# Patient Record
Sex: FEMALE
[De-identification: names, ages, dates, MRNs, and addresses within clinical notes are randomized per-mention and may not be internally consistent; named-entity substitution may affect disease eponyms.]

---

## 2018-03-13 ENCOUNTER — HOSPITAL ENCOUNTER (OUTPATIENT)
Dept: HOSPITAL 88 - RAD | Age: 68
End: 2018-03-13
Attending: OBSTETRICS & GYNECOLOGY
Payer: MEDICARE

## 2018-03-13 DIAGNOSIS — I10: ICD-10-CM

## 2018-03-13 DIAGNOSIS — Z01.812: ICD-10-CM

## 2018-03-13 DIAGNOSIS — N95.0: Primary | ICD-10-CM

## 2018-03-13 DIAGNOSIS — I35.0: ICD-10-CM

## 2018-03-13 DIAGNOSIS — Z53.09: ICD-10-CM

## 2018-03-13 DIAGNOSIS — Z01.810: ICD-10-CM

## 2018-03-13 DIAGNOSIS — Z01.818: ICD-10-CM

## 2018-03-13 PROCEDURE — 93005 ELECTROCARDIOGRAM TRACING: CPT

## 2018-03-13 PROCEDURE — 71046 X-RAY EXAM CHEST 2 VIEWS: CPT

## 2018-03-13 PROCEDURE — 80048 BASIC METABOLIC PNL TOTAL CA: CPT

## 2018-03-13 PROCEDURE — 36415 COLL VENOUS BLD VENIPUNCTURE: CPT

## 2018-03-13 PROCEDURE — 85025 COMPLETE CBC W/AUTO DIFF WBC: CPT

## 2018-03-13 NOTE — DIAGNOSTIC IMAGING REPORT
PROCEDURE:CHEST 2 VIEWS

TECHNIQUE:PA and lateral chest

INDICATION:Preoperative evaluation for D\T\C

COMPARISON:None.

 

FINDINGS:

The lungs are clear and symmetrically inflated. Prominent cardiac 

silhouette and left atrial mogul. Mild central vascular congestion. 

Intact skeleton. Multilevel degenerative disc disease.

 

 

CONCLUSION:

Mild central vascular congestion and mild cardiomegaly. No acute 

abnormality.

 

 

 

Dictated by:  Clifford Ríos M.D. on 3/13/2018 at 14:43     

Electronically approved by:  Clifford Ríos M.D. on 3/13/2018 at 

14:43

## 2018-03-14 LAB
ANION GAP SERPL CALC-SCNC: 13.9 MMOL/L (ref 8–16)
BASOPHILS # BLD AUTO: 0.1 10*3/UL (ref 0–0.1)
BASOPHILS NFR BLD AUTO: 1.4 % (ref 0–1)
BUN SERPL-MCNC: 15 MG/DL (ref 7–26)
BUN/CREAT SERPL: 21 (ref 6–25)
CALCIUM SERPL-MCNC: 9.3 MG/DL (ref 8.4–10.2)
CHLORIDE SERPL-SCNC: 105 MMOL/L (ref 98–107)
CO2 SERPL-SCNC: 31 MMOL/L (ref 22–29)
DEPRECATED NEUTROPHILS # BLD AUTO: 3.1 10*3/UL (ref 2.1–6.9)
EGFRCR SERPLBLD CKD-EPI 2021: > 60 ML/MIN (ref 60–?)
EOSINOPHIL # BLD AUTO: 0.2 10*3/UL (ref 0–0.4)
EOSINOPHIL NFR BLD AUTO: 3.4 % (ref 0–6)
ERYTHROCYTE [DISTWIDTH] IN CORD BLOOD: 13.2 % (ref 11.7–14.4)
GLUCOSE SERPLBLD-MCNC: 116 MG/DL (ref 74–118)
HCT VFR BLD AUTO: 39.2 % (ref 34.2–44.1)
HGB BLD-MCNC: 12.9 G/DL (ref 12–16)
LYMPHOCYTES # BLD: 1.6 10*3/UL (ref 1–3.2)
LYMPHOCYTES NFR BLD AUTO: 27.9 % (ref 18–39.1)
MCH RBC QN AUTO: 29.9 PG (ref 28–32)
MCHC RBC AUTO-ENTMCNC: 32.9 G/DL (ref 31–35)
MCV RBC AUTO: 90.7 FL (ref 81–99)
MONOCYTES # BLD AUTO: 0.8 10*3/UL (ref 0.2–0.8)
MONOCYTES NFR BLD AUTO: 14.1 % (ref 4.4–11.3)
NEUTS SEG NFR BLD AUTO: 53 % (ref 38.7–80)
PLATELET # BLD AUTO: 211 X10E3/UL (ref 140–360)
POTASSIUM SERPL-SCNC: 4.9 MMOL/L (ref 3.5–5.1)
RBC # BLD AUTO: 4.32 X10E6/UL (ref 3.6–5.1)
SODIUM SERPL-SCNC: 145 MMOL/L (ref 136–145)

## 2018-03-14 NOTE — XMS REPORT
Patient Summary Document

 Created on: 2018



EITAN FONG

External Reference #: [id number]

: 1950

Sex: Female



Demographics







 Address  [address] [place] [address] [address] [place]  [address]

 

 Home Phone  [phone]

 

 Preferred Language  Unknown

 

 Marital Status  Unknown

 

 Restoration Affiliation  Unknown

 

 Race  Unknown

 

 Additional Race(s)  

 

 Ethnic Group  Unknown





Author







 [facility]  [facility]

 

 [facility]

 

 Address  Unknown

 

 Phone  Unavailable







Care Team Providers







 Care Team Member Name  Role  Phone

 

 JOHAN RIDDLE  Unavailable  Unavailable







Problems

This patient has no known problems.



Allergies, Adverse Reactions, Alerts

This patient has no known allergies or adverse reactions.



Medications

This patient has no known medications.



Results







 Test Description  Test Time  Test Comments  Text Results  Atomic Results  
Result Comments









 CHEST 2 VIEWS            [address] [address]      Patient Name: EITAN FONG   
MR #: [id number]    : 1950 Age/Sex: 67/F  Acct #: [id number] Req #: 
[id number]  [facility] Physician:     Ordered by: JOHAN RIDDLE MD  Report #: [id number]
[id number]   Location: OR  Room/Bed:     _____________________________________________
______________________________________________________    Procedure: [id number] 
DX/CHEST 2 VIEWS  Exam Date:                             Exam Time:        
REPORT STATUS: Signed    PROCEDURE:   CHEST 2 VIEWS   TECHNIQUE:   PA and 
lateral chest   INDICATION:   Preoperative evaluation for D T C   COMPARISON:   
None.       FINDINGS:   The lungs are clear and symmetrically inflated. 
Prominent cardiac    silhouette and left atrial mogul. Mild central vascular 
congestion.    Intact skeleton. Multilevel degenerative disc disease.           
CONCLUSION:   Mild central vascular congestion and mild cardiomegaly. No acute 
   abnormality.               Dictated by:  Ladi Ríos M.D. on 3/13/
2018 at 14:43        Electronically approved by:  Ladi Ríos M.D. on 3/
 at    14:43                Dictated By: LADI RÍOS MD  
Electronically Signed By: LADI RÍOS MD on 18 1443  Transcribed By: 
EARLENE on 18 1443       COPY TO:   JOHAN RIDDLE MD

## 2018-12-21 LAB
ALBUMIN SERPL-MCNC: 4 G/DL (ref 3.5–5)
ALBUMIN/GLOB SERPL: 1.1 {RATIO} (ref 0.8–2)
ALP SERPL-CCNC: 70 IU/L (ref 40–150)
ALT SERPL-CCNC: 43 IU/L (ref 0–55)
ANION GAP SERPL CALC-SCNC: 16.4 MMOL/L (ref 8–16)
BASOPHILS # BLD AUTO: 0.1 10*3/UL (ref 0–0.1)
BASOPHILS NFR BLD AUTO: 1.2 % (ref 0–1)
BUN SERPL-MCNC: 18 MG/DL (ref 7–26)
BUN/CREAT SERPL: 25 (ref 6–25)
CALCIUM SERPL-MCNC: 9.7 MG/DL (ref 8.4–10.2)
CHLORIDE SERPL-SCNC: 100 MMOL/L (ref 98–107)
CO2 SERPL-SCNC: 24 MMOL/L (ref 22–29)
DEPRECATED INR PLAS: 0.89
DEPRECATED NEUTROPHILS # BLD AUTO: 4 10*3/UL (ref 2.1–6.9)
EGFRCR SERPLBLD CKD-EPI 2021: > 60 ML/MIN (ref 60–?)
EOSINOPHIL # BLD AUTO: 0.1 10*3/UL (ref 0–0.4)
EOSINOPHIL NFR BLD AUTO: 1.7 % (ref 0–6)
ERYTHROCYTE [DISTWIDTH] IN CORD BLOOD: 12.8 % (ref 11.7–14.4)
GLOBULIN PLAS-MCNC: 3.5 G/DL (ref 2.3–3.5)
GLUCOSE SERPLBLD-MCNC: 87 MG/DL (ref 74–118)
HCT VFR BLD AUTO: 39.6 % (ref 34.2–44.1)
HGB BLD-MCNC: 12.9 G/DL (ref 12–16)
LYMPHOCYTES # BLD: 1.1 10*3/UL (ref 1–3.2)
LYMPHOCYTES NFR BLD AUTO: 18.8 % (ref 18–39.1)
MCH RBC QN AUTO: 30.6 PG (ref 28–32)
MCHC RBC AUTO-ENTMCNC: 32.6 G/DL (ref 31–35)
MCV RBC AUTO: 94.1 FL (ref 81–99)
MONOCYTES # BLD AUTO: 0.6 10*3/UL (ref 0.2–0.8)
MONOCYTES NFR BLD AUTO: 9.8 % (ref 4.4–11.3)
NEUTS SEG NFR BLD AUTO: 68.2 % (ref 38.7–80)
PLATELET # BLD AUTO: 199 X10E3/UL (ref 140–360)
POTASSIUM SERPL-SCNC: 3.4 MMOL/L (ref 3.5–5.1)
PROTHROMBIN TIME: 12.9 SECONDS (ref 11.9–14.5)
RBC # BLD AUTO: 4.21 X10E6/UL (ref 3.6–5.1)
SODIUM SERPL-SCNC: 137 MMOL/L (ref 136–145)

## 2018-12-27 ENCOUNTER — HOSPITAL ENCOUNTER (OUTPATIENT)
Dept: HOSPITAL 88 - CATH LAB | Age: 68
Discharge: HOME | End: 2018-12-27
Attending: INTERNAL MEDICINE
Payer: MEDICARE

## 2018-12-27 VITALS — SYSTOLIC BLOOD PRESSURE: 124 MMHG | DIASTOLIC BLOOD PRESSURE: 73 MMHG

## 2018-12-27 VITALS — SYSTOLIC BLOOD PRESSURE: 133 MMHG | DIASTOLIC BLOOD PRESSURE: 64 MMHG

## 2018-12-27 VITALS — SYSTOLIC BLOOD PRESSURE: 129 MMHG | DIASTOLIC BLOOD PRESSURE: 68 MMHG

## 2018-12-27 VITALS — BODY MASS INDEX: 41.64 KG/M2 | WEIGHT: 235 LBS | HEIGHT: 63 IN

## 2018-12-27 VITALS — DIASTOLIC BLOOD PRESSURE: 68 MMHG | SYSTOLIC BLOOD PRESSURE: 130 MMHG

## 2018-12-27 VITALS — SYSTOLIC BLOOD PRESSURE: 136 MMHG | DIASTOLIC BLOOD PRESSURE: 70 MMHG

## 2018-12-27 VITALS — DIASTOLIC BLOOD PRESSURE: 67 MMHG | SYSTOLIC BLOOD PRESSURE: 129 MMHG

## 2018-12-27 VITALS — SYSTOLIC BLOOD PRESSURE: 123 MMHG | DIASTOLIC BLOOD PRESSURE: 70 MMHG

## 2018-12-27 VITALS — DIASTOLIC BLOOD PRESSURE: 64 MMHG | SYSTOLIC BLOOD PRESSURE: 128 MMHG

## 2018-12-27 DIAGNOSIS — Z79.82: ICD-10-CM

## 2018-12-27 DIAGNOSIS — I35.0: ICD-10-CM

## 2018-12-27 DIAGNOSIS — Z01.812: ICD-10-CM

## 2018-12-27 DIAGNOSIS — I25.10: Primary | ICD-10-CM

## 2018-12-27 DIAGNOSIS — Z79.02: ICD-10-CM

## 2018-12-27 DIAGNOSIS — Z82.49: ICD-10-CM

## 2018-12-27 DIAGNOSIS — I10: ICD-10-CM

## 2018-12-27 PROCEDURE — 85025 COMPLETE CBC W/AUTO DIFF WBC: CPT

## 2018-12-27 PROCEDURE — 36415 COLL VENOUS BLD VENIPUNCTURE: CPT

## 2018-12-27 PROCEDURE — 93454 CORONARY ARTERY ANGIO S&I: CPT

## 2018-12-27 PROCEDURE — 80053 COMPREHEN METABOLIC PANEL: CPT

## 2018-12-27 PROCEDURE — 85610 PROTHROMBIN TIME: CPT

## 2018-12-27 NOTE — NUR
AAOX3, VSS. 2ML AIR REMOVED FROM R RADIAL BAND. SITE REMAINED CLEAN, DRY, INTACT NO S/S OF 
BLEEDING OR HEMATOMA, BRISK CAPILLARY REFILL, RADIAL PULSES PALPABLE. IV PATENT. BED IN 
LOWEST POSITION, SIDE RAILS UP, CALL LIGHT WITHIN REACH.  AT BEDSIDE.

## 2018-12-27 NOTE — NUR
RETURNED TO [facility] AAOX3, VSS POST DIAGNOSTIC [facility] RADIAL BAND TO R WRIST 10ML AIR, 
SITE CLEAN, DRY, INTACT NO S/S OF BLEEDING OR HEMATOMA, BRISK CAPILLARY REFILL, RADIAL 
PULSES PALPABLE. IV PATENT. POST PROCEDURE TEACHING PROVIDED. CARDIAC DIET FOOD TRAY 
ORDERED. BED IN LOWEST POSITION, SIDE RAILS UP, CALL LIGHT WITHIN REACH.  AT BEDSIDE.

## 2018-12-27 NOTE — NUR
AAOX3, VSS. 2ML AIR REMOVED FROM R RADIAL BAND. SITE REMAINED CLEAN, DRY, INTACT NO S/S OF 
BLEEDING OR HEMATOMA, BRISK CAPILLARY REFILL, RADIAL PULSES PALPABLE. BED IN LOWEST 
POSITION, SIDE RAILS UP, CALL LIGHT WITHIN REACH.  AT BEDSIDE.

## 2018-12-27 NOTE — NUR
AAOX3, VSS. AMBULATED TO RESTROOM WITH ASSIST. SITE TO R WRIST REMAINED CLEAN, DRY, INTACT 
NO S/S OF BLEEDING OR HEMATOMA, BRISK CAPILLARY REFILL, RADIAL PULSES PALPABLE. BED IN 
LOWEST POSITION, SIDE RAILS UP, CALL LIGHT WITHIN REACH.  AT BEDSIDE.

## 2018-12-27 NOTE — NUR
AAOX3, VSS. 2ML AIR REMOVED FROM R RADIAL BAND. TR BAND REMOVED AND COVERED BY STERILE GAUZE 
AND TEGADERM. SITE TO R WRIST REMAINED CLEAN, DRY, INTACT NO S/S OF BLEEDING OR HEMATOMA, 
BRISK CAPILLARY REFILL, RADIAL PULSES PALPABLE. IV D/C WITH CATHETER INTACT, HEMOSTASIS 
OBTAINED, COVERED BY GAUZE AND COBAN. WRITTEN AND VERBAL DISCHARGE INSTRUCTIONS PROVIDED TO 
PT AND SPOUSE, VERIFIED BY RETURN DEMONSTRATION. DISCHARGED FROM UNIT BY W/C IN STABLE 
CONDITION TO SPOUSE DRIVING POV.

## 2018-12-27 NOTE — NUR
AAOX3, VSS. TOLERATING PO SOLID AND LIQUID INTAKE. SITE TO R WRIST CLEAN, DRY, INTACT NO S/S 
OF BLEEDING OR HEMATOMA, BRISK CAPILLARY REFILL, RADIAL PULSES PALPABLE. DENIED PAIN OR 
NEEDS AT THIS TIME. BED IN LOWEST POSITION, SIDE RAILS UP, CALL LIGHT WITHIN REACH.  
AT BEDSIDE.

## 2019-02-22 NOTE — OPERATIVE REPORT
DATE OF PROCEDURE:  12/27/2018

 

PROCEDURE PERFORMED:  

1. Conscious sedation 26 minutes.

2. Selective coronary angiography x2.

 

PREPROCEDURE DIAGNOSIS:  Aortic stenosis.

 

POSTPROCEDURE DIAGNOSIS:  Aortic stenosis.

 

ESTIMATED BLOOD LOSS:  Less than 20 mL.

 

SPECIMENS REMOVED:  None.

 

PROCEDURE IN DETAIL:  After informed consent was obtained, the patient was brought to

the cardiac catheterization laboratory in a fasting, nonsedated state.  Right wrist was

prepped and draped in usual sterile fashion.  Selective coronary angiography was

performed using a TIG catheter.  Left heart catheterization was not performed.

Hemostasis was achieved via TR band.  The patient tolerated the procedure well, there

were no immediate complications, and transferred back to room in stable condition. 

 

PROCEDURE FINDINGS:  

1. Left main coronary artery is patent without significant coronary artery disease.

2. Left anterior descending coronary artery has diagonal branches that are patent with

mild luminal irregularities. 

3. Left circumflex coronary artery provides 2 obtuse marginal vessels, the first of

which is a bifurcating vessel. 

4. Right coronary artery is a dominant vessel, provides posterior descending coronary

artery. 

 

IMPRESSION AND PLAN:  This is a woman, who is found to have severe aortic stenosis, who

has no coronary artery disease.  Proceed with TAVR evaluation. 

 

 

 

 

______________________________

DO FLAQUITO Felix/MORENITAL

D:  02/21/2019 12:15:09

T:  02/22/2019 02:20:20

Job #:  [id number]

## 2020-06-30 ENCOUNTER — HOSPITAL ENCOUNTER (OUTPATIENT)
Dept: HOSPITAL 88 - US | Age: 70
End: 2020-06-30
Attending: INTERNAL MEDICINE
Payer: MEDICARE

## 2020-06-30 DIAGNOSIS — K80.20: ICD-10-CM

## 2020-06-30 DIAGNOSIS — D61.818: Primary | ICD-10-CM

## 2020-06-30 PROCEDURE — 76700 US EXAM ABDOM COMPLETE: CPT

## 2020-06-30 PROCEDURE — 76857 US EXAM PELVIC LIMITED: CPT

## 2020-06-30 NOTE — DIAGNOSTIC IMAGING REPORT
EXAM:  US PELVIC (NON OB) PARISH OR F/U, US ABDOMEN COMPLETE



DATE: 6/30/2020 10:38 AM  



INDICATION: Pancytopenia  



COMPARISON: None



FINDINGS:

The visualized pancreas is unremarkable.



The liver is normal in size measuring 16.8 cm in length. Hepatic echogenicity

is within normal limits. No focal hepatic abnormality is identified. The main

portal vein is patent with antegrade flow and diameter of 0.8 cm.



Multiple shadowing stones are identified within the gallbladder. There is no

evidence for gallbladder wall thickening or pericholecystic fluid. There is no

intra or extrahepatic biliary ductal dilatation. The common bile duct measures

4 mm. Sonographic Dela Cruz's sign is negative.



The spleen is normal in size measuring 9.7 cm in length and demonstrates an

unremarkable sonographic appearance.



The kidneys are normal in size measuring 10.1 cm in length on the right and

10.5 cm in length on the left. Cortical thickness/echogenicity is within normal

limits. There is no evidence for solid renal mass, hydronephrosis, or shadowing

calculi.



The urinary bladder is unremarkable. Prevoid volume is 122 cc. Bilateral

ureteral jets are noted. The uterus is not visualized and may be surgically

absent. The ovaries are not visualized which may be secondary to their small

size. No abnormal adnexal masses are identified.



The visualized portions the IVC and aorta are within normal limits.



There is no ascites present.





IMPRESSION:



Cholelithiasis without sonographic evidence for acute cholecystitis.



Otherwise, unremarkable abdominal/pelvic ultrasound examination.



Signed by: Dr. Edwin Crump MD on 6/30/2020 12:24 PM